# Patient Record
Sex: FEMALE | Race: BLACK OR AFRICAN AMERICAN | NOT HISPANIC OR LATINO | Employment: PART TIME | URBAN - METROPOLITAN AREA
[De-identification: names, ages, dates, MRNs, and addresses within clinical notes are randomized per-mention and may not be internally consistent; named-entity substitution may affect disease eponyms.]

---

## 2024-07-29 DIAGNOSIS — R92.1 MAMMOGRAPHIC CALCIFICATION FOUND ON DIAGNOSTIC IMAGING OF BREAST: Primary | ICD-10-CM

## 2024-09-19 NOTE — PROGRESS NOTES
[facility] Breast Surg  Breast Surgical Oncology  New Patient Office Visit - H&P      Referring Provider: Dr. Paige Cole  PCP: Charo, Primary Doctor   Care Team:    Chief Complaint:   Chief Complaint   Patient presents with    Breast Mass     biopsy done on 24, no breast pain, no swelling, no nipple discharge      Subjective:     HPI:  Maria Dolores Castillo is a 39 y.o. female who presents on 2024 for evaluation of  abnormal breast imaging at an outside facility .  She presented for baseline mammogram at [facility] in [place], [place] in 2024 which noted a grouped area of calcifications in the left breast. She returned for further imaging and was recommended for biopsy.  Biopsy showed benign findings.  She has been referred here for further evaluation and states that the radiologist told her she needs the area excised. I have requested all documentation from [facility] radiology department various times and no mention of excision recommendation is seen in documents received. Also see no discussion of imaging to pathology concordance. She currently denies any breast issues including rashes, redness, pain, swelling, nipple discharge, or new lumps/masses.    Imagin2024 SCR MG at [facility] - BIRADS 0: Left breast grouped heterogenous coarse calcifications measure 1.5 x 0.9 cm located 1 cm medial and superior to the nipple.  No evidence of mass, architectural distortion, suspicious microcalcification, skin thickening or nipple retraction. Right breast with no suspicious findings.    2024 L DG MG at [facility] - BIRADS 4a:  Left breast grouped heterogenous calcification compromising 3-8 mm size calcifications, some which appear coalescent.  No evidence of mass, architectural distortion, skin thickening or nipple retraction.  Low suspicion for malignancy, biopsy should be considered.  Recommend stereotactic  biopsy.    Pathology:   2024 Left breast stereotactic biopsy of indeterminate calcifications (T-shaped biopsy marker) - fibroadenoma with dystrophic calcification    OB/GYN History:  Age at Menarche Onset: 15  Menopausal Status: premenopausal, LMP: Patient's last menstrual period was 2024 (approximate).  Hysterectomy/Oophorectomy: no, neither  Hormonal birth control (duration): 10 years total  Pregnancy History:   Age at first live birth: n/a  Hormone Replacement Therapy: No, none    Other:  MG breast density: BIRADS B  Prior thoracic RT: none  Genetic testing: none  Ashkenazi [employer] descent: No    Family History:  Family History   Problem Relation Name Age of Onset    Diabetes Mother Myra Castillo         Patient History:  Past Medical History:   Diagnosis Date    Anemia     Anxiety     Diabetes mellitus     Fibroid     Hormone disorder     Hypertension        Active Problem List with Overview Notes    Diagnosis Date Noted    Mammographic calcification found on diagnostic imaging of breast 2024        History reviewed. No pertinent surgical history.    Social History     Socioeconomic History    Marital status:    Tobacco Use    Smoking status: Never     Passive exposure: Never    Smokeless tobacco: Never   Substance and Sexual Activity    Alcohol use: Never    Drug use: Never    Sexual activity: Not Currently     Partners: Male     Birth control/protection: Abstinence         There is no immunization history on file for this patient.    Medications/Allergies:    Current Outpatient Medications:     alcohol swabs (ALCOHOL PREP) PadM, USE PAD TO THE SKIN AS DIRECTED FOR FINGERSTICKS/INJECTIONS, Disp: , Rfl:     artificial saliva, cmce-lytes, SprP, by NOT APPLICABLE route., Disp: , Rfl:     atorvastatin (LIPITOR) 80 MG tablet, Take 40 mg by mouth., Disp: , Rfl:     clobetasoL (TEMOVATE) 0.05 % external solution, Apply topically daily as needed., Disp: , Rfl:     cloNIDine 0.2 mg/24 hr td  ptwk (CATAPRES) 0.2 mg/24 hr, Apply topically., Disp: , Rfl:     EPINEPHrine (EPIPEN) 0.3 mg/0.3 mL AtIn, , Disp: , Rfl:     ferrous sulfate (FEOSOL) 325 mg (65 mg iron) Tab tablet, Take 325 mg by mouth., Disp: , Rfl:     fluoride, sodium, (PREVIDENT) 1.1 % Gel, Take by mouth., Disp: , Rfl:     fluoride, sodium, (PREVIDENT) 1.1 % Gel, Place onto teeth., Disp: , Rfl:     gabapentin (NEURONTIN) 300 MG capsule, Take 300 mg by mouth., Disp: , Rfl:     glucose 4 GM chewable tablet, Take by mouth., Disp: , Rfl:     HYDROcodone-acetaminophen (NORCO)  mg per tablet, Take by mouth., Disp: , Rfl:     hydrOXYzine HCL (ATARAX) 25 MG tablet, TAKE ONE TABLET BY MOUTH TWICE A DAY AS NEEDED AND TAKE TWO TABLETS AT BEDTIME FOR ANXIETY OR SLEEP, Disp: , Rfl:     insulin glargine-yfgn 100 unit/mL (3 mL) InPn, Inject into the skin., Disp: , Rfl:     itraconazole (SPORANOX) 100 mg Cap, TAKE TWO CAPSULES BY MOUTH EVERY DAY AS DIRECTEDTAKE WITH FOOD, Disp: , Rfl:     ketoconazole (NIZORAL) 2 % shampoo, Apply topically., Disp: , Rfl:     lisinopriL-hydrochlorothiazide (PRINZIDE,ZESTORETIC) 20-12.5 mg per tablet, Take 1 tablet by mouth once daily., Disp: , Rfl:     MULTIVITAMIN ORAL, Take by mouth., Disp: , Rfl:     multivitamin with minerals tablet, Take 1 tablet by mouth every morning., Disp: , Rfl:     potassium chloride (KLOR-CON) 10 MEQ TbSR, TAKE ONE TABLET BY MOUTH EVERY DAY FOR LOW POTASSIUM LEVEL, Disp: , Rfl:     prazosin (MINIPRESS) 2 MG Cap, Take 2 mg by mouth., Disp: , Rfl:     QUEtiapine (SEROQUEL) 200 MG Tab, TAKE ONE-HALF TABLET BY MOUTH EVERY MORNING AND TAKE ONE TABLET AT BEDTIME (AVOID GRAPEFRUIT AND GRAPEFRUIT JUICE), Disp: , Rfl:     rOPINIRole (REQUIP) 1 MG tablet, Take 0.5 tablets by mouth every evening., Disp: , Rfl:     semaglutide (OZEMPIC) 1 mg/dose (4 mg/3 mL), Inject into the skin., Disp: , Rfl:     sertraline (ZOLOFT) 100 MG tablet, Take 2 tablets by mouth once daily., Disp: , Rfl:     spironolactone  "(ALDACTONE) 100 MG tablet, Take 100 mg by mouth every morning., Disp: , Rfl:     terbinafine HCL (LAMISIL) 250 mg tablet, , Disp: , Rfl:     triamcinolone acetonide 0.1% (KENALOG) 0.1 % cream, APPLY SMALL AMOUNT TO THE SKIN TWICE A DAY AS DIRECTED TO LEFT SIDE OF SCALP AND KNEES FOR 2 WEEKS, Disp: , Rfl:     vitamin D (VITAMIN D3) 1000 units Tab, Take 25 mcg by mouth., Disp: , Rfl:      Review of patient's allergies indicates:  No Known Allergies    Review of Systems:  Pertinent items are noted in HPI.      Objective:     Vitals:  Vitals:    09/24/24 0925   BP: 119/84   BP Location: Left arm   Patient Position: Sitting   BP Method: Large (Automatic)   Pulse: 94   Resp: 20   Temp: 98.3 °F (36.8 °C)   TempSrc: Oral   SpO2: 98%   Weight: 96.6 kg (213 lb)   Height: 5' 6" (1.676 m)       Body mass index is 34.38 kg/m².     Physical Exam:  General: The patient is awake, alert and oriented times three. The patient is well nourished and in no acute distress.  Neck: There is no evidence of palpable cervical, supraclavicular or axillary adenopathy. The neck is supple. The thyroid is not enlarged.  Musculoskeletal: The patient has a normal range of motion of her bilateral upper extremities.  Chest: Examination of the chest wall fails to reveal any obvious abnormalities. Nonlabored breathing, symmetric expansion.  Breast:  Right:  Examination of right breast fails to reveal any dominant masses or areas of significant focal nodularity. The nipple is everted without evidence of discharge. There is no skin dimpling with movement of the pectoralis. There are no significant skin changes overlying the breast.   Left:  Examination of the left breast fails to reveal any dominant masses or areas of significant focal nodularity. The nipple is everted without evidence of discharge. There is no skin dimpling with movement of the pectoralis. There are no significant skin changes overlying the breast.  Abdomen: The abdomen is soft, flat, " nontender and nondistended.  Integumentary: no rashes or skin lesions present  Neurologic: cranial nerves intact, no signs of peripheral neurological deficit, motor/sensory function intact    Assessment:     Patient Active Problem List   Diagnosis    Mammographic calcification found on diagnostic imaging of breast        Maria Dolores was seen today for breast mass.    Diagnoses and all orders for this visit:    Abnormal mammogram    Mammographic calcification found on diagnostic imaging of breast  -     Ambulatory referral/consult to Breast Surgery  -     Mammo Digital Diagnostic Left with Junior; Future  -     US Breast Left Limited; Future       Reviewed breast imaging from outside facility. Confirmed finding of grouped heterogenous coarse calcifications in the left breast and agreed with recommendation for biopsy. Biopsy results reviewed. Pathology shows benign fibroadenoma with dystrophic calcifications. I have requested all documentation from [facility] radiology department various times and no mention of excision recommendation is seen in documents received. Also see no discussion of imaging to pathology concordance.       Plan:        Recommend repeat imaging at [facility] to determine if imaging is concordant with results and ensure no suspicious evolution/change.     RTC after imaging for further management.       CC: Dr. Paige Cole    All of her questions were answered. She was advised to call if she develops any questions or concerns.    Cookie Carvajal PA-C        --------------------------------------------------------------------------------------------------------------  Total time on the date of the visit ranged from 60-74 mins (70484). Total time includes both face-to-face and non-face-to-face time personally spent by myself on the day of the visit.    Non-face-to-face time included:  _X_ preparing to see the patient such as reviewing the patient record  _X_ obtaining and reviewing separately  obtained history  _X_ independently interpreting results  _X_ documenting clinical information in electronic health record.    Face-to-face time included:  _X_ performing an appropriate history and examination  _X_ communicating results to the patient  _X_ counseling and educating the patient  __ ordering appropriate medications  _x_ ordering appropriate tests  _X_ ordering appropriate procedures (including follow-up)  _X_ answering any questions the patient had    Total Time spent on date of visit: 61 minutes

## 2024-09-24 ENCOUNTER — OFFICE VISIT (OUTPATIENT)
Dept: SURGERY | Facility: CLINIC | Age: 39
End: 2024-09-24
Payer: OTHER GOVERNMENT

## 2024-09-24 VITALS
RESPIRATION RATE: 20 BRPM | BODY MASS INDEX: 34.23 KG/M2 | DIASTOLIC BLOOD PRESSURE: 84 MMHG | OXYGEN SATURATION: 98 % | TEMPERATURE: 98 F | HEIGHT: 66 IN | SYSTOLIC BLOOD PRESSURE: 119 MMHG | WEIGHT: 213 LBS | HEART RATE: 94 BPM

## 2024-09-24 DIAGNOSIS — R92.8 ABNORMAL MAMMOGRAM: Primary | ICD-10-CM

## 2024-09-24 DIAGNOSIS — R92.1 MAMMOGRAPHIC CALCIFICATION FOUND ON DIAGNOSTIC IMAGING OF BREAST: ICD-10-CM

## 2024-09-24 PROCEDURE — 99214 OFFICE O/P EST MOD 30 MIN: CPT | Mod: PBBFAC | Performed by: PHYSICIAN ASSISTANT

## 2024-09-24 PROCEDURE — 99205 OFFICE O/P NEW HI 60 MIN: CPT | Mod: S$PBB,,, | Performed by: PHYSICIAN ASSISTANT

## 2024-09-24 PROCEDURE — 99999 PR PBB SHADOW E&M-EST. PATIENT-LVL IV: CPT | Mod: PBBFAC,,, | Performed by: PHYSICIAN ASSISTANT

## 2024-09-24 RX ORDER — ROPINIROLE 1 MG/1
0.5 TABLET, FILM COATED ORAL NIGHTLY
COMMUNITY
Start: 2024-06-26 | End: 2025-06-27

## 2024-09-24 RX ORDER — KETOCONAZOLE 20 MG/ML
SHAMPOO, SUSPENSION TOPICAL
COMMUNITY
Start: 2024-09-10

## 2024-09-24 RX ORDER — LISINOPRIL AND HYDROCHLOROTHIAZIDE 12.5; 2 MG/1; MG/1
1 TABLET ORAL DAILY
COMMUNITY
Start: 2024-03-22 | End: 2025-03-23

## 2024-09-24 RX ORDER — POTASSIUM CHLORIDE 750 MG/1
TABLET, EXTENDED RELEASE ORAL
COMMUNITY
Start: 2024-03-13 | End: 2025-03-14

## 2024-09-24 RX ORDER — EPINEPHRINE 0.3 MG/.3ML
INJECTION SUBCUTANEOUS
COMMUNITY

## 2024-09-24 RX ORDER — SODIUM FLUORIDE 5 MG/G
GEL, DENTIFRICE DENTAL
COMMUNITY
Start: 2024-06-09

## 2024-09-24 RX ORDER — QUETIAPINE FUMARATE 200 MG/1
TABLET, FILM COATED ORAL
COMMUNITY
Start: 2024-05-02 | End: 2025-05-03

## 2024-09-24 RX ORDER — TRIAMCINOLONE ACETONIDE 1 MG/G
CREAM TOPICAL
COMMUNITY
Start: 2024-04-05 | End: 2025-04-06

## 2024-09-24 RX ORDER — TERBINAFINE HYDROCHLORIDE 250 MG/1
TABLET ORAL
COMMUNITY
Start: 2024-08-13

## 2024-09-24 RX ORDER — CLONIDINE 0.2 MG/24H
PATCH, EXTENDED RELEASE TRANSDERMAL
COMMUNITY
Start: 2024-03-22

## 2024-09-24 RX ORDER — INSULIN GLARGINE-YFGN 100 [IU]/ML
INJECTION, SOLUTION SUBCUTANEOUS
COMMUNITY
Start: 2024-07-17

## 2024-09-24 RX ORDER — IBUPROFEN 200 MG
TABLET ORAL
COMMUNITY
Start: 2024-01-10

## 2024-09-24 RX ORDER — CHOLECALCIFEROL (VITAMIN D3) 25 MCG
25 TABLET ORAL
COMMUNITY
Start: 2024-03-22

## 2024-09-24 RX ORDER — ITRACONAZOLE 100 MG/1
CAPSULE ORAL
COMMUNITY
Start: 2024-04-05 | End: 2025-04-06

## 2024-09-24 RX ORDER — HYDROXYZINE HYDROCHLORIDE 25 MG/1
TABLET, FILM COATED ORAL
COMMUNITY
Start: 2024-06-02 | End: 2025-05-03

## 2024-09-24 RX ORDER — HYDROCODONE BITARTRATE AND ACETAMINOPHEN 10; 325 MG/1; MG/1
TABLET ORAL
COMMUNITY
Start: 2024-09-10

## 2024-09-24 RX ORDER — SERTRALINE HYDROCHLORIDE 100 MG/1
2 TABLET, FILM COATED ORAL DAILY
COMMUNITY
Start: 2024-05-02 | End: 2025-05-03

## 2024-09-24 RX ORDER — PRAZOSIN HYDROCHLORIDE 2 MG/1
2 CAPSULE ORAL
COMMUNITY
Start: 2024-09-13

## 2024-09-24 RX ORDER — CLOBETASOL PROPIONATE 0.5 MG/ML
SOLUTION TOPICAL DAILY PRN
COMMUNITY

## 2024-09-24 RX ORDER — ATORVASTATIN CALCIUM 80 MG/1
40 TABLET, FILM COATED ORAL
COMMUNITY
Start: 2024-09-13

## 2024-09-24 RX ORDER — SPIRONOLACTONE 100 MG/1
100 TABLET, FILM COATED ORAL EVERY MORNING
COMMUNITY

## 2024-09-24 RX ORDER — GABAPENTIN 300 MG/1
300 CAPSULE ORAL
COMMUNITY
Start: 2024-06-26

## 2024-09-24 RX ORDER — FERROUS SULFATE 325(65) MG
325 TABLET ORAL
COMMUNITY
Start: 2024-06-26

## 2024-09-24 RX ORDER — ISOPROPYL ALCOHOL 70 ML/100ML
SWAB TOPICAL
COMMUNITY
Start: 2024-04-05 | End: 2025-01-10

## 2024-09-24 RX ORDER — SODIUM FLUORIDE 5 MG/G
GEL, DENTIFRICE DENTAL
COMMUNITY
Start: 2024-06-07

## 2024-10-09 ENCOUNTER — TELEPHONE (OUTPATIENT)
Dept: SURGERY | Facility: CLINIC | Age: 39
End: 2024-10-09
Payer: OTHER GOVERNMENT

## 2024-12-12 ENCOUNTER — HOSPITAL ENCOUNTER (OUTPATIENT)
Dept: RADIOLOGY | Facility: HOSPITAL | Age: 39
Discharge: HOME | End: 2024-12-12
Attending: PHYSICIAN ASSISTANT
Payer: OTHER GOVERNMENT

## 2024-12-12 DIAGNOSIS — R92.1 MAMMOGRAPHIC CALCIFICATION FOUND ON DIAGNOSTIC IMAGING OF BREAST: ICD-10-CM

## 2024-12-12 PROCEDURE — 77065 DX MAMMO INCL CAD UNI: CPT | Mod: 26,LT,, | Performed by: RADIOLOGY

## 2024-12-12 PROCEDURE — 77061 BREAST TOMOSYNTHESIS UNI: CPT | Mod: 26,LT,, | Performed by: RADIOLOGY

## 2024-12-12 PROCEDURE — 77065 DX MAMMO INCL CAD UNI: CPT | Mod: TC,LT

## 2024-12-17 NOTE — PROGRESS NOTES
[facility] [place] [facility]  Breast Surgical Oncology  Follow Up Patient Office Visit - H&P      Referring Provider: No ref. provider found  PCP: Charo, Primary Doctor   Care Team:    Chief Complaint:   Chief Complaint   Patient presents with    Follow-up     Patient reports no breast related concerns       Subjective:     Interval History:  2024 - Maria Dolores Castillo returns today for follow up after MG. Follow up MG was benign/stable since her biopsy. She is recommended to return to screening.    HPI:  Maria Dolores Castillo is a 39 y.o. female who presents on 2024 for evaluation of  abnormal breast imaging at an outside facility .  She presented for baseline mammogram at [facility] in [place], [place] in 2024 which noted a grouped area of calcifications in the left breast. She returned for further imaging and was recommended for biopsy.  Biopsy showed benign findings.  She has been referred here for further evaluation and states that the radiologist told her she needs the area excised. I have requested all documentation from [facility] radiology department various times and no mention of excision recommendation is seen in documents received. Also see no discussion of imaging to pathology concordance. She currently denies any breast issues including rashes, redness, pain, swelling, nipple discharge, or new lumps/masses.    Imagin2024 SCR MG at [facility] - BIRADS 0: Left breast grouped heterogenous coarse calcifications measure 1.5 x 0.9 cm located 1 cm medial and superior to the nipple.  No evidence of mass, architectural distortion, suspicious microcalcification, skin thickening or nipple retraction. Right breast with no suspicious findings.    2024 L DG MG at [facility] - BIRADS 4a:  Left breast grouped heterogenous calcification compromising 3-8 mm size calcifications, some which appear coalescent.  No evidence of mass,  architectural distortion, skin thickening or nipple retraction.  Low suspicion for malignancy, biopsy should be considered.  Recommend stereotactic biopsy.    2024 L DG MG at [facility] for 6 month f/u after biopsy - BIRADS 2: 1. No mammographic evidence of malignancy in the left breast. In the 12:00 left breast, middle depth, there are several heterogeneous calcifications in a grouped distribution with an associated Q shaped clip. No new calcifications compared to the postprocedural images dated 2024.  No significant interval change at the prior biopsy site in the 12:00 left breast, middle depth, further confirming benignity.    Pathology:   2024 Left breast stereotactic biopsy of indeterminate calcifications (T-shaped biopsy marker) - fibroadenoma with dystrophic calcification    OB/GYN History:  Age at Menarche Onset: 15  Menopausal Status: premenopausal, LMP: Patient's last menstrual period was 12/10/2024 (exact date).  Hysterectomy/Oophorectomy: no, neither  Hormonal birth control (duration): 10 years total  Pregnancy History:   Age at first live birth: n/a  Hormone Replacement Therapy: No, none    Other:  MG breast density: BIRADS B  Prior thoracic RT: none  Genetic testing: none  Ashkenazi [place] descent: No    Family History:  Family History   Problem Relation Name Age of Onset    Diabetes Mother Myra Castillo         Patient History:  Past Medical History:   Diagnosis Date    Anemia     Anxiety     Diabetes mellitus     Fibroid     Hormone disorder     Hypertension        Active Problem List with Overview Notes    Diagnosis Date Noted    Mammographic calcification found on diagnostic imaging of breast 2024        History reviewed. No pertinent surgical history.    Social History     Socioeconomic History    Marital status:    Tobacco Use    Smoking status: Never     Passive exposure: Never    Smokeless tobacco: Never   Substance and Sexual Activity    Alcohol use: Never    Drug  use: Never    Sexual activity: Not Currently     Partners: Male     Birth control/protection: Abstinence         There is no immunization history on file for this patient.    Medications/Allergies:    Current Outpatient Medications:     alcohol swabs (ALCOHOL PREP) PadM, USE PAD TO THE SKIN AS DIRECTED FOR FINGERSTICKS/INJECTIONS, Disp: , Rfl:     artificial saliva, cmce-lytes, SprP, by NOT APPLICABLE route., Disp: , Rfl:     atorvastatin (LIPITOR) 80 MG tablet, Take 40 mg by mouth., Disp: , Rfl:     clobetasoL (TEMOVATE) 0.05 % external solution, Apply topically daily as needed., Disp: , Rfl:     cloNIDine 0.2 mg/24 hr td ptwk (CATAPRES) 0.2 mg/24 hr, Apply topically., Disp: , Rfl:     empagliflozin-metformin (SYNJARDY) 12.5-1,000 mg Tab, Take 1 tablet by mouth 2 (two) times daily., Disp: , Rfl:     EPINEPHrine (EPIPEN) 0.3 mg/0.3 mL AtIn, , Disp: , Rfl:     fluoride, sodium, (PREVIDENT) 1.1 % Gel, Take by mouth., Disp: , Rfl:     fluoride, sodium, (PREVIDENT) 1.1 % Gel, Place onto teeth., Disp: , Rfl:     gabapentin (NEURONTIN) 300 MG capsule, Take 1 capsule by mouth daily as needed., Disp: , Rfl:     glucose 4 GM chewable tablet, Take by mouth., Disp: , Rfl:     HYDROcodone-acetaminophen (NORCO)  mg per tablet, Take by mouth., Disp: , Rfl:     hydrOXYzine HCL (ATARAX) 25 MG tablet, Take 25 mg by mouth., Disp: , Rfl:     insulin glargine-yfgn 100 unit/mL (3 mL) InPn, Inject into the skin., Disp: , Rfl:     itraconazole (SPORANOX) 100 mg Cap, Take 200 mg by mouth., Disp: , Rfl:     ketoconazole (NIZORAL) 2 % shampoo, Apply topically., Disp: , Rfl:     lisinopriL-hydrochlorothiazide (PRINZIDE,ZESTORETIC) 20-12.5 mg per tablet, Take 1 tablet by mouth once daily., Disp: , Rfl:     MULTIVITAMIN ORAL, Take by mouth., Disp: , Rfl:     multivitamin with minerals tablet, Take 1 tablet by mouth every morning., Disp: , Rfl:     prazosin (MINIPRESS) 2 MG Cap, Take 2 capsules by mouth every evening., Disp: , Rfl:      "QUEtiapine (SEROQUEL) 200 MG Tab, Take 100 mg by mouth., Disp: , Rfl:     rOPINIRole (REQUIP) 1 MG tablet, Take 0.5 tablets by mouth every evening., Disp: , Rfl:     semaglutide (OZEMPIC) 1 mg/dose (4 mg/3 mL), Inject into the skin., Disp: , Rfl:     sertraline (ZOLOFT) 100 MG tablet, Take 2 tablets by mouth once daily., Disp: , Rfl:     spironolactone (ALDACTONE) 100 MG tablet, Take 100 mg by mouth every morning., Disp: , Rfl:     terbinafine HCL (LAMISIL) 250 mg tablet, , Disp: , Rfl:     tranexamic acid (LYSTEDA) 650 mg tablet, Take 650 mg by mouth., Disp: , Rfl:     triamcinolone acetonide 0.1% (KENALOG) 0.1 % cream, Apply topically., Disp: , Rfl:     vitamin D (VITAMIN D3) 1000 units Tab, Take 25 mcg by mouth., Disp: , Rfl:      Review of patient's allergies indicates:   Allergen Reactions    Fish containing products Anaphylaxis, Hives, Itching, Rash, Shortness Of Breath and Swelling       Review of Systems:  Pertinent items are noted in HPI.      Objective:     Vitals:  Vitals:    12/18/24 0835   BP: 134/85   BP Location: Left arm   Patient Position: Sitting   Pulse: 95   Resp: 18   Temp: 98.7 °F (37.1 °C)   TempSrc: Oral   SpO2: 98%   Weight: 96.2 kg (212 lb)   Height: 5' 6" (1.676 m)         Body mass index is 34.22 kg/m².     Physical Exam:  General: The patient is awake, alert and oriented times three. The patient is well nourished and in no acute distress.  Neck: There is no evidence of palpable cervical, supraclavicular or axillary adenopathy. The neck is supple. The thyroid is not enlarged.  Musculoskeletal: The patient has a normal range of motion of her bilateral upper extremities.  Chest: Examination of the chest wall fails to reveal any obvious abnormalities. Nonlabored breathing, symmetric expansion.  Breast:  Right:  Examination of right breast fails to reveal any dominant masses or areas of significant focal nodularity. The nipple is everted without evidence of discharge. There is no skin " dimpling with movement of the pectoralis. There are no significant skin changes overlying the breast.   Left:  Examination of the left breast fails to reveal any dominant masses or areas of significant focal nodularity. The nipple is everted without evidence of discharge. There is no skin dimpling with movement of the pectoralis. There are no significant skin changes overlying the breast.  Abdomen: The abdomen is soft, flat, nontender and nondistended.  Integumentary: no rashes or skin lesions present  Neurologic: cranial nerves intact, no signs of peripheral neurological deficit, motor/sensory function intact    Assessment:     Patient Active Problem List   Diagnosis    Mammographic calcification found on diagnostic imaging of breast        Maria Dolores was seen today for follow-up.    Diagnoses and all orders for this visit:    Mammographic calcification found on diagnostic imaging of breast  Comments:  f/u imaging stable/benign         Discussed post biopsy short-interval imaging f/u results as well as negative CBE today. No mammographic evidence of malignancy in the left breast. In the area of biopsy, there are no new calcifications compared to the postprocedural images dated 05/24/2024.  No significant interval change at the prior biopsy site in the 12:00 left breast, middle depth, further confirming benignity. She is recommended to return to routine screening.    Plan:        Return to screening, next due April 2025. May follow up as needed. Recommend continue follow up with PCP or GYN for future screening exams.     RTC as needed.     Healthy lifestyle guidelines were reviewed. She was encouraged to engage in regular exercise, maintain a healthy body weight, and avoid excessive alcohol consumption. Healthy nutritional guidelines were also discussed. Self-breast examination was reviewed with the patient in detail and she was encouraged to perform this on a monthly basis.        CC: Dr. Paige Cole; please fax  imaging results as well      All of her questions were answered. She was advised to call if she develops any questions or concerns.    Cookie Carvajal PA-C        --------------------------------------------------------------------------------------------------------------  --------------------------------------------------------------------------------------------------------------  Total time on the date of the visit ranged from 20-29 mins (50749). Total time includes both face-to-face and non-face-to-face time personally spent by myself on the day of the visit.    Non-face-to-face time included:  _X_ preparing to see the patient such as reviewing the patient record  __ obtaining and reviewing separately obtained history  _X_ independently interpreting results  _X_ documenting clinical information in electronic health record.    Face-to-face time included:  _X_ performing an appropriate history and examination  _X_ communicating results to the patient  _X_ counseling and educating the patient  __ ordering appropriate medications  __ ordering appropriate tests  _X_ ordering appropriate procedures (including follow-up)  _X_ answering any questions the patient had    Total Time spent on date of visit: 25 minutes

## 2024-12-18 ENCOUNTER — OFFICE VISIT (OUTPATIENT)
Dept: SURGERY | Facility: CLINIC | Age: 39
End: 2024-12-18
Payer: OTHER GOVERNMENT

## 2024-12-18 VITALS
WEIGHT: 212 LBS | HEIGHT: 66 IN | TEMPERATURE: 99 F | OXYGEN SATURATION: 98 % | BODY MASS INDEX: 34.07 KG/M2 | RESPIRATION RATE: 18 BRPM | DIASTOLIC BLOOD PRESSURE: 85 MMHG | HEART RATE: 95 BPM | SYSTOLIC BLOOD PRESSURE: 134 MMHG

## 2024-12-18 DIAGNOSIS — R92.1 MAMMOGRAPHIC CALCIFICATION FOUND ON DIAGNOSTIC IMAGING OF BREAST: Primary | ICD-10-CM

## 2024-12-18 PROCEDURE — 99213 OFFICE O/P EST LOW 20 MIN: CPT | Mod: S$PBB,,, | Performed by: PHYSICIAN ASSISTANT

## 2024-12-18 PROCEDURE — 99214 OFFICE O/P EST MOD 30 MIN: CPT | Mod: PBBFAC | Performed by: PHYSICIAN ASSISTANT

## 2024-12-18 PROCEDURE — 99999 PR PBB SHADOW E&M-EST. PATIENT-LVL IV: CPT | Mod: PBBFAC,,, | Performed by: PHYSICIAN ASSISTANT

## 2024-12-18 RX ORDER — CLONIDINE 0.2 MG/24H
PATCH, EXTENDED RELEASE TRANSDERMAL
COMMUNITY
Start: 2024-09-13

## 2024-12-18 RX ORDER — PRAZOSIN HYDROCHLORIDE 2 MG/1
2 CAPSULE ORAL NIGHTLY
COMMUNITY
Start: 2024-09-23

## 2024-12-18 RX ORDER — GABAPENTIN 300 MG/1
1 CAPSULE ORAL DAILY PRN
COMMUNITY
Start: 2024-06-26

## 2024-12-18 RX ORDER — TRIAMCINOLONE ACETONIDE 1 MG/G
CREAM TOPICAL
COMMUNITY
Start: 2024-09-13

## 2024-12-18 RX ORDER — QUETIAPINE FUMARATE 200 MG/1
100 TABLET, FILM COATED ORAL
COMMUNITY
Start: 2024-09-23

## 2024-12-18 RX ORDER — ITRACONAZOLE 100 MG/1
200 CAPSULE ORAL
COMMUNITY
Start: 2024-04-05

## 2024-12-18 RX ORDER — TRANEXAMIC ACID 650 MG/1
650 TABLET ORAL
COMMUNITY
Start: 2024-11-26

## 2024-12-18 RX ORDER — HYDROXYZINE HYDROCHLORIDE 25 MG/1
25 TABLET, FILM COATED ORAL
COMMUNITY
Start: 2024-09-23